# Patient Record
Sex: FEMALE | Race: WHITE | NOT HISPANIC OR LATINO | Employment: OTHER | URBAN - METROPOLITAN AREA
[De-identification: names, ages, dates, MRNs, and addresses within clinical notes are randomized per-mention and may not be internally consistent; named-entity substitution may affect disease eponyms.]

---

## 2023-03-10 ENCOUNTER — TELEPHONE (OUTPATIENT)
Dept: PRIMARY CARE | Facility: CLINIC | Age: 63
End: 2023-03-10
Payer: COMMERCIAL

## 2023-03-10 NOTE — TELEPHONE ENCOUNTER
She called and said she was having chest pain. Said they changed her dilantin at the pharmacy 2 days ago. Some friends told her maybe she needs to get bloodwork done to check the amount in her system

## 2023-03-16 DIAGNOSIS — R79.89 LOW VITAMIN D LEVEL: ICD-10-CM

## 2023-03-16 RX ORDER — OMEGA-3S/DHA/EPA/FISH OIL/D3 300MG-1000
50 CAPSULE ORAL DAILY
Qty: 30 TABLET | Refills: 5 | Status: SHIPPED | OUTPATIENT
Start: 2023-03-16

## 2023-03-16 RX ORDER — OMEGA-3S/DHA/EPA/FISH OIL/D3 300MG-1000
1 CAPSULE ORAL DAILY
COMMUNITY
Start: 2022-12-20 | End: 2023-03-16 | Stop reason: SDUPTHER

## 2023-03-22 PROBLEM — E55.9 VITAMIN D DEFICIENCY: Status: ACTIVE | Noted: 2023-03-22

## 2023-03-22 PROBLEM — E87.1 HYPONATREMIA: Status: ACTIVE | Noted: 2023-03-22

## 2023-03-22 PROBLEM — L29.0 RECTAL ITCHING: Status: ACTIVE | Noted: 2023-03-22

## 2023-03-22 PROBLEM — R74.8 ELEVATED ALKALINE PHOSPHATASE LEVEL: Status: ACTIVE | Noted: 2023-03-22

## 2023-03-22 PROBLEM — R71.8 ELEVATED HEMATOCRIT: Status: ACTIVE | Noted: 2023-03-22

## 2023-03-22 PROBLEM — R73.03 PREDIABETES: Status: ACTIVE | Noted: 2023-03-22

## 2023-03-22 PROBLEM — K59.00 CONSTIPATION: Status: ACTIVE | Noted: 2023-03-22

## 2023-03-22 PROBLEM — G47.00 INSOMNIA: Status: ACTIVE | Noted: 2023-03-22

## 2023-03-22 PROBLEM — L30.9 ECZEMA: Status: ACTIVE | Noted: 2023-03-22

## 2023-03-22 PROBLEM — M54.2 CHRONIC NECK PAIN: Status: ACTIVE | Noted: 2023-03-22

## 2023-03-22 PROBLEM — R53.83 FATIGUE: Status: ACTIVE | Noted: 2023-03-22

## 2023-03-22 PROBLEM — E78.5 DYSLIPIDEMIA: Status: ACTIVE | Noted: 2023-03-22

## 2023-03-22 PROBLEM — G89.29 CHRONIC NECK PAIN: Status: ACTIVE | Noted: 2023-03-22

## 2023-03-22 PROBLEM — I47.19 JUNCTIONAL TACHYCARDIA (CMS-HCC): Status: ACTIVE | Noted: 2023-03-22

## 2023-03-22 PROBLEM — R19.00 PELVIC MASS IN FEMALE: Status: ACTIVE | Noted: 2023-03-22

## 2023-03-22 PROBLEM — K52.9 ENTEROCOLITIS: Status: ACTIVE | Noted: 2023-03-22

## 2023-03-22 PROBLEM — F17.210 CIGARETTE NICOTINE DEPENDENCE WITHOUT COMPLICATION: Status: ACTIVE | Noted: 2023-03-22

## 2023-03-22 PROBLEM — R56.9 SEIZURE (MULTI): Status: ACTIVE | Noted: 2023-03-22

## 2023-03-22 PROBLEM — D32.9 MENINGIOMA (MULTI): Status: ACTIVE | Noted: 2023-03-22

## 2023-03-22 PROBLEM — J32.9 SINUSITIS: Status: ACTIVE | Noted: 2023-03-22

## 2023-03-22 RX ORDER — FLUTICASONE PROPIONATE 50 MCG
1 SPRAY, SUSPENSION (ML) NASAL 2 TIMES DAILY
COMMUNITY
Start: 2022-05-05

## 2023-03-22 RX ORDER — CARBOXYMETHYLCELLULOSE SODIUM 5 MG/ML
SOLUTION/ DROPS OPHTHALMIC
COMMUNITY

## 2023-03-22 RX ORDER — PHENYTOIN SODIUM 100 MG/1
200 CAPSULE, EXTENDED RELEASE ORAL 2 TIMES DAILY
COMMUNITY
Start: 2021-06-10

## 2023-03-28 ENCOUNTER — APPOINTMENT (OUTPATIENT)
Dept: PRIMARY CARE | Facility: CLINIC | Age: 63
End: 2023-03-28
Payer: COMMERCIAL

## 2023-04-25 LAB — URINE CULTURE: NORMAL
